# Patient Record
Sex: MALE | Race: WHITE | NOT HISPANIC OR LATINO | Employment: FULL TIME | URBAN - METROPOLITAN AREA
[De-identification: names, ages, dates, MRNs, and addresses within clinical notes are randomized per-mention and may not be internally consistent; named-entity substitution may affect disease eponyms.]

---

## 2025-01-20 ENCOUNTER — OFFICE VISIT (OUTPATIENT)
Dept: PRIMARY CARE | Facility: CLINIC | Age: 40
End: 2025-01-20
Payer: COMMERCIAL

## 2025-01-20 VITALS
HEIGHT: 71 IN | OXYGEN SATURATION: 97 % | BODY MASS INDEX: 27.58 KG/M2 | HEART RATE: 83 BPM | DIASTOLIC BLOOD PRESSURE: 82 MMHG | TEMPERATURE: 97.9 F | SYSTOLIC BLOOD PRESSURE: 118 MMHG | WEIGHT: 197 LBS

## 2025-01-20 DIAGNOSIS — Z00.00 ROUTINE GENERAL MEDICAL EXAMINATION AT A HEALTH CARE FACILITY: ICD-10-CM

## 2025-01-20 DIAGNOSIS — L98.9 SKIN LESION: Primary | ICD-10-CM

## 2025-01-20 PROCEDURE — 99213 OFFICE O/P EST LOW 20 MIN: CPT | Performed by: INTERNAL MEDICINE

## 2025-01-20 PROCEDURE — 3008F BODY MASS INDEX DOCD: CPT | Performed by: INTERNAL MEDICINE

## 2025-01-20 ASSESSMENT — PATIENT HEALTH QUESTIONNAIRE - PHQ9
2. FEELING DOWN, DEPRESSED OR HOPELESS: NOT AT ALL
SUM OF ALL RESPONSES TO PHQ9 QUESTIONS 1 AND 2: 0
1. LITTLE INTEREST OR PLEASURE IN DOING THINGS: NOT AT ALL

## 2025-01-20 ASSESSMENT — ENCOUNTER SYMPTOMS
OCCASIONAL FEELINGS OF UNSTEADINESS: 0
LOSS OF SENSATION IN FEET: 0
DEPRESSION: 0

## 2025-01-20 NOTE — PROGRESS NOTES
" Subjective   Patient ID: Ronen Kearney is a 39 y.o. male who presents for Mass (EP.  Bump on back of head for over 1 year and getting larger.  No pain.).  HPI  Patient presents today with nodule on the back of his head on the right side.  He states has been there about a year and it has been gradually getting bigger.  He has never been red or hot or painful or had other signs of infection.  It soft and fluctuant.  His wife notes that it has been growing.  He has no other complaints but he is due for physical And has not had one in some time    Review of Systems  Review of systems was performed and is otherwise negative except as noted in HPI.      Objective   /82   Pulse 83   Temp 36.6 °C (97.9 °F) (Oral)   Ht 1.803 m (5' 11\")   Wt 89.4 kg (197 lb)   SpO2 97%   BMI 27.48 kg/m²      Physical Exam  HEENT is normal  Lungs clear bilaterally  Heart is regular rate rhythm no murmurs  On the posterior aspect of his scalp on the right side he has a 3 to 4 cm round soft fluctuant cyst  Assessment/Plan   Diagnoses and all orders for this visit:  Skin lesion  -     Referral to General Surgery; Future  Routine general medical examination at a health care facility  -     CBC and Auto Differential; Future  -     Comprehensive Metabolic Panel; Future  -     Lipid Panel; Future  -     TSH with reflex to Free T4 if abnormal; Future    Referred to general surgery for cyst removal I gave him Dr. Villa's number in the referral line so he can schedule    Bisi Rodrigues MD  "

## 2025-01-28 ENCOUNTER — PREP FOR PROCEDURE (OUTPATIENT)
Dept: SURGERY | Facility: CLINIC | Age: 40
End: 2025-01-28

## 2025-01-28 ENCOUNTER — APPOINTMENT (OUTPATIENT)
Dept: SURGERY | Facility: CLINIC | Age: 40
End: 2025-01-28
Payer: COMMERCIAL

## 2025-01-28 VITALS
SYSTOLIC BLOOD PRESSURE: 149 MMHG | DIASTOLIC BLOOD PRESSURE: 82 MMHG | RESPIRATION RATE: 17 BRPM | WEIGHT: 198 LBS | BODY MASS INDEX: 27.72 KG/M2 | OXYGEN SATURATION: 96 % | TEMPERATURE: 98.2 F | HEIGHT: 71 IN | HEART RATE: 71 BPM

## 2025-01-28 DIAGNOSIS — L98.9 SKIN LESION: ICD-10-CM

## 2025-01-28 DIAGNOSIS — R22.0 SCALP MASS: Primary | ICD-10-CM

## 2025-01-28 DIAGNOSIS — R22.9 SUBCUTANEOUS MASS: Primary | ICD-10-CM

## 2025-01-28 PROCEDURE — 99203 OFFICE O/P NEW LOW 30 MIN: CPT | Performed by: SURGERY

## 2025-01-28 PROCEDURE — 3008F BODY MASS INDEX DOCD: CPT | Performed by: SURGERY

## 2025-01-28 RX ORDER — AZITHROMYCIN 250 MG/1
TABLET, FILM COATED ORAL
COMMUNITY
Start: 2024-05-20

## 2025-01-28 NOTE — PROGRESS NOTES
History Of Present Illness :  Ronen Kearney is a 39 y.o. male who presents referral from Dr. Bisi Rodrigues for further evaluation and treatment of a scalp lesion.  The patient recently saw Dr. Rodrigues on 1/20/2025 and that note was reviewed.  The patient at that time noted a 1 year history of a scalp mass on the posterior aspect of the head, which is increased in size over the last year.    To me, the patient notes a 2-year history of a subcutaneous mass of the right occipital scalp.  When this was first noticed it was the size of a small marble.  This is significantly enlarged since then.  This causes some mild discomfort when wearing a hat or undergoing a haircut.  At rest, there is no pain.  He is never had inflammation or drainage.  He has never had a similar lesion in the past.    Patient is otherwise healthy.  He is  and lives in [place].  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    Past Medical History   Past Medical History:   Diagnosis Date    Other conditions influencing health status     No significant past medical history        Surgical History    No past surgical history on file.     Allergies   No Known Allergies     Home Meds  No current outpatient medications     Family History    No family history on file.     Social History  Social History     Tobacco Use    Smoking status: Never    Smokeless tobacco: Never   Vaping Use    Vaping status: Never Used   Substance Use Topics    Alcohol use: Never    Drug use: Never        Review Of Systems    Review of Systems    General: Not Present- Obesity, Cancer, HIV, MRSA, Recent Cold/Flu, Tired During the Day and VRE.  HEENT: Not Present- Migraine, Cataracts, Glaucoma, Macular Degeneration and Retinal Detachment.  Respiratory:Not Present- Asthma, Chronic Cough, Difficulty Breathing on Exertion, Difficulty Breathing at Rest, Emphysema, Frequent Bronchitis, Home CPAP/BiPAP, Home Oxygen, Pulmonary Embolus, Pneumonia/TB, Sleep Apnea  and Snoring.  Cardiovascular: Not Present- Chest Pain, Congestive Heart Failure, Heart Attack, Coronary Artery Disease, Heart Stent, High Cholesterol/Lipids,Hypertension, Internal Defibrillator, Irregular Heart Beat, Mitral Valve Prolapse, Murmur, Pacemaker and Peripheral Vascular Disease.  Gastrointestinal: Not Present- Heartburn, Hepatitis, Hiatal Hernia, Jaundice, Reflux, Stomach Ulcer and IBS.  Male Genitourinary: Not Present- Enlarged Prostate, Kidney Failure, Kidney Stones, Dialysis and Urinary Tract Infection.  Musculoskeletal: Not Present- Arthritis, Back Pain and Fibromyalgia.  Neurological: Not Present- Headaches, Numbness, Tingling, Seizures, Stroke,  Shunt and Weakness.  Psychiatric: Not Present- Anxiety, Bipolar, Depression and Panic Attacks.  Endocrine: Not Present- Diabetes, Hyperthyroidism, Hypothyroidism and Low Blood Sugar.  Hematology: Not Present- Abnormal Bleeding, Anemia and Blood Clots.    Vitals  There were no vitals taken for this visit.     Physical Exam   Skin & Soft Tissue Exam    Integumentary  Global Assessment: Examination of related systems reveals - Well-developed, well-nourished and in no acute distress; alert and oriented x 3,  Neck supple, with no palpable masses, no thyromegaly, No lymphadenopathy and Apocrine and  eccrine glands are normal with no hyperhidrosis.   Upon inspection and palpation of skin surfaces of the - Head/Face: no rashes, ulcers, lesions or evidence of photo damage. No palpable nodules or masses, Neck: no visible lesions or palpable masses, Chest: no swelling,scarring or lesions. No prominent arteries or veins observed, Axillae: non-tender, no inflammation or ulceration, no drainage., Abdomen: no scars, rashes or lesions, Back: normal skin surface, no evidence of  photo damage, no suspicious lesions, Right upper extremity: no lesions or rashes. No evidence of photo damage, Left upper extremity: no lesions or rashes. No evidence of photo damage, Right lower  extremity: no stasis ulcers, rashes or suspicious lesions. No evidence of photo damage, Left lower extremity: no stasis ulcers, rashes or suspicious lesions. No evidence of  photo damage, Distribution of scalp and body hair is normal and No dandruff or other scalp lesions noted.    Scalp: 4 cm superior to the hairline, in the right occipital scalp, there is a 3 cm transverse by 2 cm sagittal by roughly 1.5 cm in depth firm mobile well-circumscribed subcutaneous mass.    Chest and Lung Exam  Chest and lung exam reveals - normal excursion with symmetric chest walls and on auscultation, normal breath sounds, no  adventitious sounds and normal vocal resonance.    Cardiovascular  Cardiovascular examination reveals - normal heart sounds, regular rate and rhythm with no murmurs.    Assessment/Plan   Mr. Kearney has a subcutaneous mass of the right occipital scalp of uncertain etiology.  This is likely epidermal inclusion cyst versus lipoma.    Recommendations:    In order to relieve symptoms and establish a diagnosis, excision of this mass is indicated and recommended.    Will proceed with this under local anesthesia as an outpatient at [facility] on Friday, 2/7/2025.    I will see the patient for a postoperative visit at my [facility] office on Tuesday, 2/18/2025.    Skin and Soft Tissue Consent: The procedure was explained to the patient in detail, including the risks, benefits and alternatives. The risks include, but not limited to, infection, bleeding, hematoma, seroma, nerve injury resulting in motor or sensory deficit/disability, recurrence, prolonged wound healing and the need for further surgery.  The patient agreed with the plan and signed the electronic consent.

## 2025-01-28 NOTE — H&P (VIEW-ONLY)
History Of Present Illness :  Ronen Kearney is a 39 y.o. male who presents referral from Dr. Bisi Rodrigues for further evaluation and treatment of a scalp lesion.  The patient recently saw Dr. oRdrigues on 1/20/2025 and that note was reviewed.  The patient at that time noted a 1 year history of a scalp mass on the posterior aspect of the head, which is increased in size over the last year.    To me, the patient notes a 2-year history of a subcutaneous mass of the right occipital scalp.  When this was first noticed it was the size of a small marble.  This is significantly enlarged since then.  This causes some mild discomfort when wearing a hat or undergoing a haircut.  At rest, there is no pain.  He is never had inflammation or drainage.  He has never had a similar lesion in the past.    Patient is otherwise healthy.  He is  and lives in [place].  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    Past Medical History   Past Medical History:   Diagnosis Date    Other conditions influencing health status     No significant past medical history        Surgical History    No past surgical history on file.     Allergies   No Known Allergies     Home Meds  No current outpatient medications     Family History    No family history on file.     Social History  Social History     Tobacco Use    Smoking status: Never    Smokeless tobacco: Never   Vaping Use    Vaping status: Never Used   Substance Use Topics    Alcohol use: Never    Drug use: Never        Review Of Systems    Review of Systems    General: Not Present- Obesity, Cancer, HIV, MRSA, Recent Cold/Flu, Tired During the Day and VRE.  HEENT: Not Present- Migraine, Cataracts, Glaucoma, Macular Degeneration and Retinal Detachment.  Respiratory:Not Present- Asthma, Chronic Cough, Difficulty Breathing on Exertion, Difficulty Breathing at Rest, Emphysema, Frequent Bronchitis, Home CPAP/BiPAP, Home Oxygen, Pulmonary Embolus, Pneumonia/TB, Sleep Apnea  and Snoring.  Cardiovascular: Not Present- Chest Pain, Congestive Heart Failure, Heart Attack, Coronary Artery Disease, Heart Stent, High Cholesterol/Lipids,Hypertension, Internal Defibrillator, Irregular Heart Beat, Mitral Valve Prolapse, Murmur, Pacemaker and Peripheral Vascular Disease.  Gastrointestinal: Not Present- Heartburn, Hepatitis, Hiatal Hernia, Jaundice, Reflux, Stomach Ulcer and IBS.  Male Genitourinary: Not Present- Enlarged Prostate, Kidney Failure, Kidney Stones, Dialysis and Urinary Tract Infection.  Musculoskeletal: Not Present- Arthritis, Back Pain and Fibromyalgia.  Neurological: Not Present- Headaches, Numbness, Tingling, Seizures, Stroke,  Shunt and Weakness.  Psychiatric: Not Present- Anxiety, Bipolar, Depression and Panic Attacks.  Endocrine: Not Present- Diabetes, Hyperthyroidism, Hypothyroidism and Low Blood Sugar.  Hematology: Not Present- Abnormal Bleeding, Anemia and Blood Clots.    Vitals  There were no vitals taken for this visit.     Physical Exam   Skin & Soft Tissue Exam    Integumentary  Global Assessment: Examination of related systems reveals - Well-developed, well-nourished and in no acute distress; alert and oriented x 3,  Neck supple, with no palpable masses, no thyromegaly, No lymphadenopathy and Apocrine and  eccrine glands are normal with no hyperhidrosis.   Upon inspection and palpation of skin surfaces of the - Head/Face: no rashes, ulcers, lesions or evidence of photo damage. No palpable nodules or masses, Neck: no visible lesions or palpable masses, Chest: no swelling,scarring or lesions. No prominent arteries or veins observed, Axillae: non-tender, no inflammation or ulceration, no drainage., Abdomen: no scars, rashes or lesions, Back: normal skin surface, no evidence of  photo damage, no suspicious lesions, Right upper extremity: no lesions or rashes. No evidence of photo damage, Left upper extremity: no lesions or rashes. No evidence of photo damage, Right lower  extremity: no stasis ulcers, rashes or suspicious lesions. No evidence of photo damage, Left lower extremity: no stasis ulcers, rashes or suspicious lesions. No evidence of  photo damage, Distribution of scalp and body hair is normal and No dandruff or other scalp lesions noted.    Scalp: 4 cm superior to the hairline, in the right occipital scalp, there is a 3 cm transverse by 2 cm sagittal by roughly 1.5 cm in depth firm mobile well-circumscribed subcutaneous mass.    Chest and Lung Exam  Chest and lung exam reveals - normal excursion with symmetric chest walls and on auscultation, normal breath sounds, no  adventitious sounds and normal vocal resonance.    Cardiovascular  Cardiovascular examination reveals - normal heart sounds, regular rate and rhythm with no murmurs.    Assessment/Plan   Mr. Kearney has a subcutaneous mass of the right occipital scalp of uncertain etiology.  This is likely epidermal inclusion cyst versus lipoma.    Recommendations:    In order to relieve symptoms and establish a diagnosis, excision of this mass is indicated and recommended.    Will proceed with this under local anesthesia as an outpatient at [facility] on Friday, 2/7/2025.    I will see the patient for a postoperative visit at my [facility] office on Tuesday, 2/18/2025.    Skin and Soft Tissue Consent: The procedure was explained to the patient in detail, including the risks, benefits and alternatives. The risks include, but not limited to, infection, bleeding, hematoma, seroma, nerve injury resulting in motor or sensory deficit/disability, recurrence, prolonged wound healing and the need for further surgery.  The patient agreed with the plan and signed the electronic consent.

## 2025-02-06 ENCOUNTER — TELEPHONE (OUTPATIENT)
Dept: SURGERY | Facility: CLINIC | Age: 40
End: 2025-02-06
Payer: COMMERCIAL

## 2025-02-06 NOTE — TELEPHONE ENCOUNTER
Tried to call patient on home, cell and emergency contact all numbers have been disconnected. Tried to reach pt about surgery with Dr. Villa tomorrow regarding his arrival time.

## 2025-02-07 ENCOUNTER — HOSPITAL ENCOUNTER (OUTPATIENT)
Facility: HOSPITAL | Age: 40
Setting detail: OUTPATIENT SURGERY
Discharge: HOME | End: 2025-02-07
Attending: SURGERY | Admitting: SURGERY
Payer: COMMERCIAL

## 2025-02-07 VITALS
WEIGHT: 195 LBS | OXYGEN SATURATION: 98 % | BODY MASS INDEX: 27.2 KG/M2 | SYSTOLIC BLOOD PRESSURE: 137 MMHG | DIASTOLIC BLOOD PRESSURE: 80 MMHG | TEMPERATURE: 97.5 F | HEART RATE: 77 BPM | RESPIRATION RATE: 18 BRPM

## 2025-02-07 DIAGNOSIS — R22.9 SUBCUTANEOUS MASS: Primary | ICD-10-CM

## 2025-02-07 PROCEDURE — 3600000008 HC OR TIME - EACH INCREMENTAL 1 MINUTE - PROCEDURE LEVEL THREE: Performed by: SURGERY

## 2025-02-07 PROCEDURE — 7100000009 HC PHASE TWO TIME - INITIAL BASE CHARGE: Performed by: SURGERY

## 2025-02-07 PROCEDURE — 2500000005 HC RX 250 GENERAL PHARMACY W/O HCPCS: Performed by: SURGERY

## 2025-02-07 PROCEDURE — 3600000003 HC OR TIME - INITIAL BASE CHARGE - PROCEDURE LEVEL THREE: Performed by: SURGERY

## 2025-02-07 PROCEDURE — 2720000007 HC OR 272 NO HCPCS: Performed by: SURGERY

## 2025-02-07 PROCEDURE — 88304 TISSUE EXAM BY PATHOLOGIST: CPT | Mod: TC,PARLAB | Performed by: SURGERY

## 2025-02-07 PROCEDURE — 7100000010 HC PHASE TWO TIME - EACH INCREMENTAL 1 MINUTE: Performed by: SURGERY

## 2025-02-07 PROCEDURE — 2500000004 HC RX 250 GENERAL PHARMACY W/ HCPCS (ALT 636 FOR OP/ED): Performed by: SURGERY

## 2025-02-07 PROCEDURE — 21012 EXC FACE LES SBQ 2 CM/>: CPT | Performed by: SURGERY

## 2025-02-07 RX ORDER — TRAMADOL HYDROCHLORIDE 50 MG/1
50 TABLET ORAL EVERY 6 HOURS PRN
Qty: 9 TABLET | Refills: 0 | Status: SHIPPED | OUTPATIENT
Start: 2025-02-07 | End: 2025-02-10

## 2025-02-07 RX ORDER — SODIUM CHLORIDE 0.9 G/100ML
INJECTION, SOLUTION IRRIGATION AS NEEDED
Status: DISCONTINUED | OUTPATIENT
Start: 2025-02-07 | End: 2025-02-07 | Stop reason: HOSPADM

## 2025-02-07 RX ORDER — LIDOCAINE HYDROCHLORIDE AND EPINEPHRINE 10; 10 UG/ML; MG/ML
INJECTION, SOLUTION INFILTRATION; PERINEURAL AS NEEDED
Status: DISCONTINUED | OUTPATIENT
Start: 2025-02-07 | End: 2025-02-07 | Stop reason: HOSPADM

## 2025-02-07 ASSESSMENT — COLUMBIA-SUICIDE SEVERITY RATING SCALE - C-SSRS
6. HAVE YOU EVER DONE ANYTHING, STARTED TO DO ANYTHING, OR PREPARED TO DO ANYTHING TO END YOUR LIFE?: NO
2. HAVE YOU ACTUALLY HAD ANY THOUGHTS OF KILLING YOURSELF?: NO
1. IN THE PAST MONTH, HAVE YOU WISHED YOU WERE DEAD OR WISHED YOU COULD GO TO SLEEP AND NOT WAKE UP?: NO

## 2025-02-07 ASSESSMENT — PAIN - FUNCTIONAL ASSESSMENT: PAIN_FUNCTIONAL_ASSESSMENT: 0-10

## 2025-02-07 ASSESSMENT — PAIN SCALES - GENERAL: PAINLEVEL_OUTOF10: 0 - NO PAIN

## 2025-02-07 NOTE — OP NOTE
RIGHT OCCIPITAL SCALP SUBCUTANEOUS MASS EXCISION (R) Operative Note     Date: 2025  OR Location: [facility]    Name: Ronen Kearney, : 1985, Age: 39 y.o., MRN: [id number], Sex: male    Diagnosis  Pre-op Diagnosis      * Subcutaneous mass [R22.9] Post-op Diagnosis     * Subcutaneous mass [R22.9]     Procedures  RIGHT OCCIPITAL SCALP SUBCUTANEOUS MASS EXCISION  22969 - [place] EXCISION TUMOR SOFT TISS FACE/SCALP SUBQ 2 CM/>      Surgeons      * Placido Villa - Primary    Resident/Fellow/Other Assistant:  CONSUELO Palomino    Staff:   Circulator: Marjan  Surgical Assistant: Janet Caba Person: Cehr  Surgical Assistant: Cassie  Circulator: Ruth    Anesthesia Staff: No anesthesia staff entered.    Procedure Summary  Anesthesia: Local with a total of 8 ml of 1% lidocaine with epinephrine   ASA: ASA status not filed in the log.  Estimated Blood Loss: 15 mL  Intra-op Medications: * Intraprocedure medication information is unavailable because the case start and end events have not been set *      Intraprocedure I/O Totals       None           Specimen:   ID Type Source Tests Collected by Time   1 : OCCIPITAL SCALP SUBCUTANEOUS MASS Tissue SOFT TISSUE MASS RESECTION SURGICAL PATHOLOGY EXAM Placido Villa MD 2025 1227         Drains and/or Catheters: * None in log *    Tourniquet Times:       Implants:     Findings: See below    Indications: Ronen Kearney is an 39 y.o. male who is having surgery for Subcutaneous mass [R22.9].     The patient was seen in the preoperative area. The risks, benefits, complications, treatment options, non-operative alternatives, expected recovery and outcomes were discussed with the patient. The possibilities of reaction to medication, pulmonary aspiration, injury to surrounding structures, bleeding, recurrent infection, the need for additional procedures, failure to diagnose a condition, and creating a complication requiring transfusion or operation were discussed with the  patient. The patient concurred with the proposed plan, giving informed consent.  The site of surgery was properly noted/marked if necessary per policy. The patient has been actively warmed in preoperative area. Preoperative antibiotics are not indicated. Venous thrombosis prophylaxis are not indicated.    Procedure Details:     Findings:   In the right occipital region of the scalp, 4 cm superior to the hairline, there was a transversely oriented 3 x 2 x 0.8 cm encapsulated subcutaneous fatty mass consistent with lipoma     Specimens:  Subcutaneous mass, scalp    Procedure:      The patient was taken to the operating room placed on the table in the prone position.  Preoperative huddle was accomplished with the OR team and the patient.  The right occipital scalp was prepared and draped in normal sterile fashion. The surgical site was marked by the surgeon preoperatively. After the appropriate timeout, the case commenced.    A 4 cm transverse incision was marked directly over the mass. The skin and subcutaneous tissues were anesthetized using the above-noted local infiltrative anesthesia. The skin and SQ tissues were divided down to the mass. The fatty mass was then grasped, elevated, and circumferentially and sharply dissected free, removed in toto, and sent to pathology as a specimen. Hemostasis was obtained using direct pressure and minimal electrocautery, in addition to a 3-0 Vicryl suture ligature.. The subcutaneous tissues were then approximated using interrupted 3-0 Vicryl sutures. The skin was then approximated using a running locking 3-0 Monocryl suture. The wound was cleaned and dried, and antibiotic ointment was placed along the incision.  This completed the operation.    The patient tolerated the procedure well. Sponge, needle, and instrument counts were correct times 2.  EBL was 15 cc.  The patient was taken to the post-operative holding area with stable vital signs and in excellent condition.    Placido AHMADI  Allen TOTH  Complications:  None; patient tolerated the procedure well.    Disposition: PACU - hemodynamically stable.  Condition: stable     Task Performed by RNFA or Surgical Assistant:  Performed the typical tasks required of a First Assistant/Surgical Assistant/LASHAE First Assist or Physician Assistant such as prepping the patient, placing Hawthorne catheter if necessary, retraction, assisting with traction and countertraction, tying sutures, docking and assisting with instrumentation when utilizing the DaVinci robot, securing drains if necessary, approximating the skin of the incision, and placing occlusive sterile dressings.    Additional Details: None    Attending Attestation: I performed the procedure.    Placido Villa  Phone Number: [phone]

## 2025-02-07 NOTE — DISCHARGE INSTRUCTIONS
For postoperative analgesia/pain relief, I recommend:     a.  Tylenol Extra Strength 500 mg with ibuprofen 400 - 600 mg (two or three, 200 mg Advil or Motrin tablets ) 4 times a day with food, on schedule, for 1-2 days, then as needed thereafter.      b.  Supplement with Tramadol 50 mg, dispense #9 for more severe or breakthrough pain, as needed.  This has been electronically prescribed to your pharmacy.  Please  this prescription within 7 days of today's visit, or the pharmacist will not fill the prescription.

## 2025-02-13 LAB
LABORATORY COMMENT REPORT: NORMAL
PATH REPORT.FINAL DX SPEC: NORMAL
PATH REPORT.GROSS SPEC: NORMAL
PATH REPORT.RELEVANT HX SPEC: NORMAL
PATH REPORT.TOTAL CANCER: NORMAL

## 2025-02-17 NOTE — PROGRESS NOTES
Post-Operative Office Visit  Ronen Kearney presents for his postoperative visit.  On 2/7/2025 the patient underwent the following procedure:    RIGHT OCCIPITAL SCALP SUBCUTANEOUS MASS EXCISION  21012 - [place] EXCISION TUMOR SOFT TISS FACE/SCALP SUBQ 2 CM/>    Please see the operative note for details.    Pathology revealed:    FINAL DIAGNOSIS      Scalp (occipital) lesion, excision:  -- Mature adipose tissue consistent with lipoma (3.5 cm).     Unremarkable postoperative course.  The patient used tramadol as needed for the first 2 to 3 days, and ibuprofen as needed for 7-10 days.  His wife has been applying antibiotic ointment along the incision daily.    Vitals  There were no vitals taken for this visit.     Exam    Post Op General Physical Exam    The physical exam findings are as follows:    Integumentary    Incision - 3 cm transverse incision in the right occipital region of the scalp, 4 cm superior to the hairline - Dry, Intact, No evidence of infection, hematoma, or seroma, edges well-approximated.  No drainage present, no ecchymosis, no redness and no warmth to the touch.  Running locking 4-0 Monocryl suture removed.    Assessment and Plan    Mr. Urias has done very well postoperatively.    Recommendations:    Stop antibiotic ointment    Daily after showering, may massage moisturizing cream ointment or lotion along incision for several weeks to help soften    Hardness along the incision will resolve in 3-4 months    No activity restrictions    Follow-up as needed

## 2025-02-18 ENCOUNTER — APPOINTMENT (OUTPATIENT)
Dept: SURGERY | Facility: CLINIC | Age: 40
End: 2025-02-18
Payer: COMMERCIAL

## 2025-02-18 VITALS
RESPIRATION RATE: 17 BRPM | BODY MASS INDEX: 28.42 KG/M2 | WEIGHT: 203 LBS | TEMPERATURE: 98.2 F | SYSTOLIC BLOOD PRESSURE: 131 MMHG | HEIGHT: 71 IN | HEART RATE: 65 BPM | DIASTOLIC BLOOD PRESSURE: 79 MMHG

## 2025-02-18 DIAGNOSIS — D17.0 LIPOMA OF SCALP: Primary | ICD-10-CM

## 2025-02-18 PROCEDURE — 3008F BODY MASS INDEX DOCD: CPT | Performed by: SURGERY

## 2025-02-18 PROCEDURE — 1036F TOBACCO NON-USER: CPT | Performed by: SURGERY

## 2025-02-18 PROCEDURE — 99024 POSTOP FOLLOW-UP VISIT: CPT | Performed by: SURGERY

## 2025-06-05 DIAGNOSIS — Z13.0 SCREENING FOR BLOOD DISEASE: ICD-10-CM

## 2025-06-05 DIAGNOSIS — L98.9 SKIN LESION: ICD-10-CM

## 2025-06-05 DIAGNOSIS — Z13.228 SCREENING FOR METABOLIC DISORDER: ICD-10-CM

## 2025-06-05 DIAGNOSIS — Z00.00 ROUTINE GENERAL MEDICAL EXAMINATION AT A HEALTH CARE FACILITY: ICD-10-CM

## 2025-06-05 DIAGNOSIS — Z13.220 SCREENING FOR LIPID DISORDERS: ICD-10-CM

## 2025-06-05 DIAGNOSIS — R22.9 SUBCUTANEOUS MASS: ICD-10-CM

## 2025-06-05 DIAGNOSIS — Z11.59 NEED FOR HEPATITIS C SCREENING TEST: ICD-10-CM

## 2025-06-05 DIAGNOSIS — Z13.29 SCREENING FOR THYROID DISORDER: ICD-10-CM

## 2025-06-27 ENCOUNTER — APPOINTMENT (OUTPATIENT)
Dept: PRIMARY CARE | Facility: CLINIC | Age: 40
End: 2025-06-27
Payer: COMMERCIAL

## 2025-10-31 ENCOUNTER — APPOINTMENT (OUTPATIENT)
Dept: PRIMARY CARE | Facility: CLINIC | Age: 40
End: 2025-10-31
Payer: COMMERCIAL

## (undated) DEVICE — SYRINGE, 10 CC, LUER LOCK

## (undated) DEVICE — DRAPE, SHEET, W/APERTURE, SMALL, 16 X 16 IN, DISPOSABLE, STERILE

## (undated) DEVICE — APPLICATOR, CHLORAPREP, W/ORANGE TINT, 26ML

## (undated) DEVICE — OINTMENT, BACITRACIN, W/ZINC, 1 OZ

## (undated) DEVICE — ELECTRODE, ELECTROSURGICAL, COATED NEEDLE, EDGE, STERILE

## (undated) DEVICE — Device

## (undated) DEVICE — NEEDLE, HYPODERMIC, SAFETYGLIDE, SHIELDING, REGULAR WALL, REGULAR BEVEL, 22 G X 1.5 IN, BLACK HUB

## (undated) DEVICE — STRIP, SKIN CLOSURE, STERI STRIP, REINFORCED, 0.5 X 4 IN